# Patient Record
Sex: MALE | Race: WHITE | URBAN - METROPOLITAN AREA
[De-identification: names, ages, dates, MRNs, and addresses within clinical notes are randomized per-mention and may not be internally consistent; named-entity substitution may affect disease eponyms.]

---

## 2018-01-01 ENCOUNTER — HOSPITAL ENCOUNTER (EMERGENCY)
Facility: CLINIC | Age: 63
Discharge: HOME | End: 2018-04-18
Attending: EMERGENCY MEDICINE | Admitting: EMERGENCY MEDICINE
Payer: COMMERCIAL

## 2018-01-01 VITALS
TEMPERATURE: 98.2 F | OXYGEN SATURATION: 96 % | RESPIRATION RATE: 20 BRPM | HEART RATE: 82 BPM | WEIGHT: 196 LBS | SYSTOLIC BLOOD PRESSURE: 128 MMHG | DIASTOLIC BLOOD PRESSURE: 79 MMHG

## 2018-01-01 DIAGNOSIS — J34.89 NASAL SEPTAL PERFORATION: ICD-10-CM

## 2018-01-01 DIAGNOSIS — R04.0 EPISTAXIS: ICD-10-CM

## 2018-01-01 PROCEDURE — 99282 EMERGENCY DEPT VISIT SF MDM: CPT

## 2018-01-01 RX ORDER — OXYMETAZOLINE HYDROCHLORIDE 0.05 G/100ML
SPRAY NASAL
Status: DISCONTINUED
Start: 2018-01-01 | End: 2018-01-01 | Stop reason: HOSPADM

## 2018-01-01 ASSESSMENT — ENCOUNTER SYMPTOMS
DIZZINESS: 0
LIGHT-HEADEDNESS: 0

## 2018-04-18 NOTE — ED PROVIDER NOTES
History     Chief Complaint:  Epistaxis    HPI   Jon Oliva is a 62 year old male with a history of atrial fibrillation, CHF, hypertension, and diabetes on Xarelto and baby aspirin who presents to the emergency department today for evaluation of epistaxis and is accompanied by his sister.  The patient was started on Coumadin nearly one month ago for atrial fibrillation, then recently switched over to Xarelto and was reportedly discharged from [facility] yesterday.  He reports bleeding from both nares, left greater than right, since 0100 this morning with no history of this.  He states that he  bumped his nose on his bed while rolling over which he feels may have triggered the bleeding. He also uses a humidified nasal CPAP for 15+ years.  He denies any dizziness or lightheadedness, and otherwise feels well. Bleeding has improved since arrival to the ED.    Allergies:  No Known Drug Allergies    Medications:    Amiodarone  Digoxin  Nexium  Insulin aspart  Lisinopril  Nitroglycerin  Xarelto  Spironolactone  Thiamine  Torsemide    Past Medical History:    Atrial fibrillation  CHF  Diabetes  Hypertension    Past Surgical History:    History reviewed. No pertinent past surgical history.    Family History:    History reviewed. No pertinent family history.     Social History:  The patient was accompanied to the ED by his sister.    Review of Systems   HENT: Positive for nosebleeds.    Neurological: Negative for dizziness and light-headedness.   All other systems reviewed and are negative.    Physical Exam     Patient Vitals for the past 24 hrs:   BP Temp Pulse Resp SpO2 Weight   04/18/18 1219 109/70 98.2  F (36.8  C) 82 20 100 % 88.9 kg (196 lb)     Physical Exam  General: Well appearing, nontoxic. Resting comfortably  Head:  Scalp, face, and head appear normal  Eyes:  Pupils are equal, round, and reactive to light    Conjunctivae non-injected and sclerae white  ENT:    The external nose is normal, dried blood at the bilateral  nares    There is a large nasal septal perforation with a majorty of the anterior nasal septum absent. The edges of the remaining septal tissue are erythematous with small areas of clot. Turbinates normal.     Pinnae are normal    The oropharynx is normal, mucous membranes moist. No blood/bleeding visualized in the posterior pharynx.     Uvula is in the midline  Neck:  Normal range of motion    There is no rigidity noted    Trachea is in the midline  CV:  Regular rate, irregularly irregular rhythm.     Normal S1/S2, no S3/S4    No murmur or rub  Resp:  Lungs are clear and equal bilaterally    There is no tachypnea    No increased work of breathing    No rales, wheezing, or rhonchi  GI:  Abdomen is soft, no rigidity or guarding    No distension, or mass    No tenderness or rebound tenderness   MS:  Normal muscular tone    Symmetric motor strength  Skin:  No rash or acute skin lesions noted  Neuro: Awake and alert    Speech is normal and fluent    Moves all extremities spontaneously  Psych:  Normal affect.  Appropriate interactions.    Emergency Department Course     Interventions:  1303 Afrin 0.05% 2 sprays/nares    Emergency Department Course:  Nursing notes and vitals reviewed.  1259: I performed an exam of the patient as documented above.   1357: I rechecked the patient and bleeding was controlled at this time. I discussed the treatment plan with the patient.  He expressed understanding of this plan and consented to discharge.  He will be discharged home with instructions for care and follow up.  In addition, the patient will return to the emergency department if his symptoms worsen, if new symptoms arise or if there is any concern.  All questions were answered prior to discharge.    Impression & Plan      Medical Decision Making:  Jon Oliva is a 62 year old male who presents for evaluation of nasal bleeding and epistaxis. Exam reveals a large nasal septal perforation. Patient does not recall any injury that may  have caused this. He denies seeing tissue after blowing his nose. His CPAP machine does not have nasal prongs.  The bleeding was controlled with afrin and local pressure and therefore supportive outpatient management is indicated.  Given the septal perforation close follow-up with ENT and primary care is recommended.  The patient denies any symptoms of serious anemia. VSS in the ED.  The bleeding stopped and did not restart here in ED, therefore no nasal packing is indicated.  Discussed with patient to use humidity and vaseline or bacitracin in nares bid for the next week.  Strict nasal precautions, no blowing, picking, placing objects in the nose. Return precautions were discussed with patient. The patient's questions were answered and the patient was agreeable with discharge.     Diagnosis:    ICD-10-CM    1. Epistaxis R04.0    2. Nasal septal perforation J34.89      Disposition:   Home    Scribe Disclosure:  Aidee FRAZIER, am serving as a scribe at 12:59 PM on 4/18/2018 to document services personally performed by Nish Zamora MD, based on my observations and the provider's statements to me.    4/18/2018   [facility] EMERGENCY DEPARTMENT       Nish Zamora MD  04/18/18 4221

## 2018-04-18 NOTE — ED AVS SNAPSHOT
" [facility] Emergency Department    [address]    [facility] [address]    Phone:  [phone]    Fax:  [phone]                                       Jon Oliva   MRN: [id number]    Department:  [facility] Emergency Department   Date of Visit:  4/18/2018           Patient Information     Date Of Birth          1955        Your diagnoses for this visit were:     Epistaxis     Nasal septal perforation        You were seen by Nish Zamora MD.      Follow-up Information     Follow up with [facility] (ENT Specialist). Schedule an appointment as soon as possible for a visit in 3 days.    Why:  For close follow up of nasal septum perforation    Contact information:    [facility]  [address] (At [facility] [address]  Please call [place] office: [phone]  Fax: [phone]        Discharge Instructions       Discharge Instructions  Epistaxis    Today you were seen for a nosebleed.   Nosebleeds (the medical term is \"epistaxis\") are very common. Almost every person has had at least one in their lifetime.  Although the amount of blood loss can appear dramatic, nosebleeds rarely cause serious problems.  The most common causes are dry air or nose picking, but they also are common in people who have allergies, high blood pressure, or are on blood thinners (such as Coumadin, Aspirin or Plavix). If you or your child gets a nosebleed, the important thing is to know how to take care of it. With the right self-care, most nosebleeds will stop on their own.    Generally, every Emergency Department visit should have a follow-up clinic visit with either a primary or a specialty clinic/provider. Please follow-up as instructed by your emergency provider today.    Return to the Emergency Department if:    Your nose is bleeding a very large amount of blood and you are unable to stop it.    You get very pale, faint, or " tired.    You cannot get the bleeding to stop after following these instructions.    Treatment:    Your provider may tell you to use a decongestant nose spray, like Afrin  (oxymetazoline), in both nostrils in the morning and at night for the next three days. Do not use this medicine for more than three days at a time.  If you do, it will cause nasal congestion.     Use a moisturizer. A small amount of Vaseline  to the inside of your nostrils for moisture before bed is one option. There are nasal sprays available over-the-counter for this purpose as well.  Using a humidifier in your bedroom or home will help as well when the air is dry.    For the next three days, do not blow your nose or put anything in your nose. You may sniffle, or dab the outside of your nose.    Do not bend with your head below your waist for the next three days. Do not lift anything so heavy that you have to strain.     If you received nasal packing, please do not remove the packing until seen by an Ear, Nose, and Throat (ENT) specialist.  If antibiotics have been given with the packing, please take as directed.    If your nose starts to bleed again:    Blow your nose to get rid of some of the clots that have formed inside your nostrils. This may increase the bleeding temporarily, but that is okay.    Spray decongestant nose spray (like Afrin ) into both nostrils to constrict the blood vessels.    Sit or stand while bending forward slightly at the waist. Do not lie down or tilt your head back. This may cause you to swallow blood and can lead to vomiting.     the soft part of BOTH nostrils at the bottom of your nose and squeeze your nose closed for at least 5 minutes (for children) or 10 to 15 minutes (for adults). Use a clock to time yourself. Do not release the pressure every so often to check whether the bleeding has stopped. Many people hurt their chances of stopping the bleeding by releasing the pressure too soon or too often.    If you  follow the steps outlined above, and your nose continues to bleed, repeat all the steps once more. Apply pressure for a total of at least 30 minutes. If you continue to bleed even then, seek medical attention.  If you were given a prescription for medicine here today, be sure to read all of the information (including the package insert) that comes with your prescription.  This will include important information about the medicine, its side effects, and any warnings that you need to know about.  The pharmacist who fills the prescription can provide more information and answer questions you may have about the medicine.  If you have questions or concerns that the pharmacist cannot address, please call or return to the Emergency Department.   Remember that you can always come back to the Emergency Department if you are not able to see your regular provider in the amount of time listed above, if you get any new symptoms, or if there is anything that worries you.      24 Hour Appointment Hotline       To make an appointment at any [facility], call [phone] ([phone]). If you don't have a family doctor or clinic, we will help you find one. [facility] clinics are conveniently located to serve the needs of you and your family.             Review of your medicines      Our records show that you are taking the medicines listed below. If these are incorrect, please call your family doctor or clinic.        Dose / Directions Last dose taken    AMIODARONE HCL PO        Refills:  0        DIGOXIN PO        Refills:  0        LISINOPRIL PO        Refills:  0        NEXIUM PO        Refills:  0        NITROSTAT SL   Dose:  0.4 mg        Place 0.4 mg under the tongue   Refills:  0        NovoLOG FLEXPEN 100 UNIT/ML injection   Generic drug:  insulin aspart        Inject Subcutaneous 3 times daily (with meals)   Refills:  0        SPIRONOLACTONE PO        Refills:  0        THIAMINE HCL PO        Refills:  0         TORSEMIDE PO        Refills:  0        XARELTO PO        Refills:  0                Orders Needing Specimen Collection     None      Pending Results     No orders found from 4/16/2018 to 4/19/2018.            Pending Culture Results     No orders found from 4/16/2018 to 4/19/2018.            Pending Results Instructions     If you had any lab results that were not finalized at the time of your Discharge, you can call the ED Lab Result RN at [phone]. You will be contacted by this team for any positive Lab results or changes in treatment. The nurses are available 7 days a week from 10A to 6:30P.  You can leave a message 24 hours per day and they will return your call.        Test Results From Your Hospital Stay               Clinical Quality Measure: Blood Pressure Screening     Your blood pressure was checked while you were in the emergency department today. The last reading we obtained was  BP: 109/70 . Please read the guidelines below about what these numbers mean and what you should do about them.  If your systolic blood pressure (the top number) is less than 120 and your diastolic blood pressure (the bottom number) is less than 80, then your blood pressure is normal. There is nothing more that you need to do about it.  If your systolic blood pressure (the top number) is 120-139 or your diastolic blood pressure (the bottom number) is 80-89, your blood pressure may be higher than it should be. You should have your blood pressure rechecked within a year by a primary care provider.  If your systolic blood pressure (the top number) is 140 or greater or your diastolic blood pressure (the bottom number) is 90 or greater, you may have high blood pressure. High blood pressure is treatable, but if left untreated over time it can put you at risk for heart attack, stroke, or kidney failure. You should have your blood pressure rechecked by a primary care provider within the next 4 weeks.  If your provider in the  "emergency department today gave you specific instructions to follow-up with your doctor or provider even sooner than that, you should follow that instruction and not wait for up to 4 weeks for your follow-up visit.        Thank you for choosing [facility]       Thank you for choosing [facility] for your care. Our goal is always to provide you with excellent care. Hearing back from our patients is one way we can continue to improve our services. Please take a few minutes to complete the written survey that you may receive in the mail after you visit with us. Thank you!        [employer] [employer] Information     [employer] lets you send messages to your doctor, view your test results, renew your prescriptions, schedule appointments and more. To sign up, go to [email or web address] . Click on \"Log in\" on the left side of the screen, which will take you to the Welcome page. Then click on \"Sign up Now\" on the right side of the page.     You will be asked to enter the access code listed below, as well as some personal information. Please follow the directions to create your username and password.     Your access code is: [email or web address]  Expires: 2018  2:14 PM     Your access code will  in 90 days. If you need help or a new code, please call your [facility] clinic or [phone].        Care EveryWhere ID     This is your Care EveryWhere ID. This could be used by other organizations to access your [facility] medical records  [id number]        Equal Access to Services     DAVID GONZALEZ : Hadii jethro richo Soblane, waaxda luqadaha, qaybta kaalmada adealexisyada, judson lacey . So [facility] [phone].    ATENCIÓN: Si habla español, tiene a razo disposición servicios gratuitos de asistencia lingüística. Franco al [phone].    We comply with applicable federal civil rights laws and [place] laws. We do not discriminate on the basis of race, color, national origin, age, disability, sex, sexual orientation, or " gender identity.            After Visit Summary       This is your record. Keep this with you and show to your community pharmacist(s) and doctor(s) at your next visit.

## 2018-04-18 NOTE — ED NOTES
Patient states he had bloody nose since 1 am. Patient is on Xarelto. ABC intact alert and no distress.

## 2018-04-18 NOTE — ED AVS SNAPSHOT
[facility] Emergency Department    [address]    [facility] [address]    Phone:  [phone]    Fax:  [phone]                                       Jon Oliva   MRN: [id number]    Department:  [facility] Emergency Department   Date of Visit:  4/18/2018           After Visit Summary Signature Page     I have received my discharge instructions, and my questions have been answered. I have discussed any challenges I see with this plan with the nurse or doctor.    ..........................................................................................................................................  Patient/Patient Representative Signature      ..........................................................................................................................................  Patient Representative Print Name and Relationship to Patient    ..................................................               ................................................  Date                                            Time    ..........................................................................................................................................  Reviewed by Signature/Title    ...................................................              ..............................................  Date                                                            Time